# Patient Record
Sex: FEMALE | Race: WHITE | URBAN - METROPOLITAN AREA
[De-identification: names, ages, dates, MRNs, and addresses within clinical notes are randomized per-mention and may not be internally consistent; named-entity substitution may affect disease eponyms.]

---

## 2022-07-26 ENCOUNTER — TELEPHONE ENCOUNTER (OUTPATIENT)
Dept: URBAN - METROPOLITAN AREA CLINIC 80 | Facility: CLINIC | Age: 62
End: 2022-07-26

## 2022-07-26 RX ORDER — PANTOPRAZOLE SODIUM 40 MG
TAKE 1 TABLET BY ORAL ROUTE 2 TIMES A DAY FOR 30 DAYS TABLET, DELAYED RELEASE (ENTERIC COATED) ORAL 2
Qty: 60 | Refills: 0
Start: 2020-02-13

## 2022-08-03 ENCOUNTER — OFFICE VISIT (OUTPATIENT)
Dept: URBAN - METROPOLITAN AREA TELEHEALTH 2 | Facility: TELEHEALTH | Age: 62
End: 2022-08-03
Payer: COMMERCIAL

## 2022-08-03 DIAGNOSIS — K21.00 GASTROESOPHAGEAL REFLUX DISEASE WITH ESOPHAGITIS WITHOUT HEMORRHAGE: ICD-10-CM

## 2022-08-03 PROBLEM — 266433003: Status: ACTIVE | Noted: 2022-08-03

## 2022-08-03 PROCEDURE — 99442 PHONE E/M BY PHYS 11-20 MIN: CPT | Performed by: INTERNAL MEDICINE

## 2022-08-03 RX ORDER — ALPRAZOLAM 1 MG
TABLET ORAL
Qty: 0 | Refills: 0 | Status: ACTIVE | COMMUNITY
Start: 1900-01-01

## 2022-08-03 RX ORDER — FAMOTIDINE 20 MG/1
TABLET, FILM COATED ORAL
Qty: 0 | Refills: 0 | Status: ACTIVE | COMMUNITY
Start: 1900-01-01

## 2022-08-03 RX ORDER — ONDANSETRON 4 MG/1
DISSOLVE  1 TABLET BY ORAL ROUTE EVERY 6 HOURS AS NEEDED TABLET, ORALLY DISINTEGRATING ORAL
Qty: 14 | Refills: 0 | Status: ACTIVE | COMMUNITY
Start: 2020-02-11

## 2022-08-03 RX ORDER — PANTOPRAZOLE SODIUM 40 MG
TAKE 1 TABLET BY ORAL ROUTE 2 TIMES A DAY FOR 30 DAYS TABLET, DELAYED RELEASE (ENTERIC COATED) ORAL 2
Qty: 60 | Refills: 0 | Status: ACTIVE | COMMUNITY
Start: 2020-02-13

## 2022-08-03 RX ORDER — HYDROCORTISONE ACETATE 25 MG/1
_INSERT 1 SUPPOSITORY BY RECTAL ROUTE DAILY FOR 30 DAYS SUPPOSITORY RECTAL 1
Qty: 30 | Refills: 3 | Status: ACTIVE | COMMUNITY
Start: 2017-06-14

## 2022-09-19 ENCOUNTER — P2P PATIENT RECORD (OUTPATIENT)
Age: 62
End: 2022-09-19

## 2022-09-23 ENCOUNTER — TELEPHONE ENCOUNTER (OUTPATIENT)
Dept: URBAN - METROPOLITAN AREA CLINIC 80 | Facility: CLINIC | Age: 62
End: 2022-09-23

## 2022-09-23 RX ORDER — PANTOPRAZOLE SODIUM 40 MG
TAKE 1 TABLET BY ORAL ROUTE 2 TIMES A DAY FOR 30 DAYS TABLET, DELAYED RELEASE (ENTERIC COATED) ORAL 2
Qty: 60 | Refills: 11
Start: 2020-02-13

## 2022-09-28 ENCOUNTER — ERX REFILL RESPONSE (OUTPATIENT)
Dept: URBAN - METROPOLITAN AREA CLINIC 126 | Facility: CLINIC | Age: 62
End: 2022-09-28

## 2022-09-28 RX ORDER — PANTOPRAZOLE SODIUM 40 MG
TAKE 1 TABLET BY ORAL ROUTE 2 TIMES A DAY FOR 30 DAYS TABLET, DELAYED RELEASE (ENTERIC COATED) ORAL 2
Qty: 60 | Refills: 11 | OUTPATIENT

## 2022-09-28 RX ORDER — PANTOPRAZOLE 40 MG/1
TAKE ONE TABLET BY MOUTH TWICE A DAY TABLET, DELAYED RELEASE ORAL
Qty: 60 TABLET | Refills: 0 | OUTPATIENT

## 2022-10-14 ENCOUNTER — WEB ENCOUNTER (OUTPATIENT)
Dept: URBAN - METROPOLITAN AREA CLINIC 126 | Facility: CLINIC | Age: 62
End: 2022-10-14

## 2022-10-14 ENCOUNTER — OFFICE VISIT (OUTPATIENT)
Dept: URBAN - METROPOLITAN AREA CLINIC 126 | Facility: CLINIC | Age: 62
End: 2022-10-14
Payer: COMMERCIAL

## 2022-10-14 VITALS
TEMPERATURE: 96.9 F | DIASTOLIC BLOOD PRESSURE: 80 MMHG | WEIGHT: 146 LBS | HEART RATE: 83 BPM | HEIGHT: 62 IN | BODY MASS INDEX: 26.87 KG/M2 | SYSTOLIC BLOOD PRESSURE: 120 MMHG

## 2022-10-14 DIAGNOSIS — R79.89 ABNORMAL LFTS (LIVER FUNCTION TESTS): ICD-10-CM

## 2022-10-14 PROCEDURE — 99214 OFFICE O/P EST MOD 30 MIN: CPT | Performed by: INTERNAL MEDICINE

## 2022-10-14 RX ORDER — HYDROCORTISONE ACETATE 25 MG/1
_INSERT 1 SUPPOSITORY BY RECTAL ROUTE DAILY FOR 30 DAYS SUPPOSITORY RECTAL 1
Qty: 30 | Refills: 3 | Status: ON HOLD | COMMUNITY
Start: 2017-06-14

## 2022-10-14 RX ORDER — ONDANSETRON 4 MG/1
DISSOLVE  1 TABLET BY ORAL ROUTE EVERY 6 HOURS AS NEEDED TABLET, ORALLY DISINTEGRATING ORAL
Qty: 14 | Refills: 0 | Status: ON HOLD | COMMUNITY
Start: 2020-02-11

## 2022-10-14 RX ORDER — FAMOTIDINE 20 MG/1
TABLET, FILM COATED ORAL
Qty: 0 | Refills: 0 | Status: ON HOLD | COMMUNITY
Start: 1900-01-01

## 2022-10-14 RX ORDER — PANTOPRAZOLE 40 MG/1
TAKE ONE TABLET BY MOUTH TWICE A DAY TABLET, DELAYED RELEASE ORAL
Qty: 60 TABLET | Refills: 0 | Status: ACTIVE | COMMUNITY

## 2022-10-14 RX ORDER — ALPRAZOLAM 1 MG
TABLET ORAL
Qty: 0 | Refills: 0 | Status: ACTIVE | COMMUNITY
Start: 1900-01-01

## 2022-10-24 ENCOUNTER — LAB OUTSIDE AN ENCOUNTER (OUTPATIENT)
Dept: URBAN - METROPOLITAN AREA CLINIC 126 | Facility: CLINIC | Age: 62
End: 2022-10-24

## 2022-10-25 LAB
ALBUMIN/GLOBULIN RATIO: 1.6
ALBUMIN: 4.6
ALKALINE PHOSPHATASE: 82
ALT (SGPT): 25
AST (SGOT): 23
BILIRUBIN, DIRECT: 0.1
BILIRUBIN, INDIRECT: 0.4
BILIRUBIN, TOTAL: 0.5
GLOBULIN: 2.9
PROTEIN, TOTAL: 7.5

## 2024-03-11 ENCOUNTER — LAB (OUTPATIENT)
Dept: URBAN - METROPOLITAN AREA CLINIC 126 | Facility: CLINIC | Age: 64
End: 2024-03-11

## 2024-03-11 ENCOUNTER — OV EP (OUTPATIENT)
Dept: URBAN - METROPOLITAN AREA CLINIC 126 | Facility: CLINIC | Age: 64
End: 2024-03-11
Payer: COMMERCIAL

## 2024-03-11 VITALS
HEART RATE: 85 BPM | DIASTOLIC BLOOD PRESSURE: 80 MMHG | BODY MASS INDEX: 27.16 KG/M2 | SYSTOLIC BLOOD PRESSURE: 120 MMHG | HEIGHT: 62 IN | WEIGHT: 147.6 LBS | TEMPERATURE: 97.2 F

## 2024-03-11 DIAGNOSIS — Z86.010 PERSONAL HISTORY OF COLONIC POLYPS: ICD-10-CM

## 2024-03-11 PROBLEM — 428283002: Status: ACTIVE | Noted: 2024-03-11

## 2024-03-11 PROCEDURE — 99214 OFFICE O/P EST MOD 30 MIN: CPT | Performed by: INTERNAL MEDICINE

## 2024-03-11 RX ORDER — PANTOPRAZOLE 40 MG/1
TAKE ONE TABLET BY MOUTH TWICE A DAY TABLET, DELAYED RELEASE ORAL
Qty: 60 TABLET | Refills: 0 | Status: ON HOLD | COMMUNITY

## 2024-03-11 RX ORDER — FAMOTIDINE 20 MG/1
TABLET, FILM COATED ORAL
Qty: 0 | Refills: 0 | Status: ON HOLD | COMMUNITY
Start: 1900-01-01

## 2024-03-11 RX ORDER — HYDROCORTISONE ACETATE 25 MG/1
_INSERT 1 SUPPOSITORY BY RECTAL ROUTE DAILY FOR 30 DAYS SUPPOSITORY RECTAL 1
Qty: 30 | Refills: 3 | Status: ON HOLD | COMMUNITY
Start: 2017-06-14

## 2024-03-11 RX ORDER — SODIUM, POTASSIUM,MAG SULFATES 17.5-3.13G
354 ML SOLUTION, RECONSTITUTED, ORAL ORAL
Qty: 354 ML | Refills: 0 | OUTPATIENT
Start: 2024-03-11 | End: 2024-03-12

## 2024-03-11 RX ORDER — ALPRAZOLAM 1 MG
TABLET ORAL
Qty: 0 | Refills: 0 | Status: ACTIVE | COMMUNITY
Start: 1900-01-01

## 2024-03-11 RX ORDER — ONDANSETRON 4 MG/1
DISSOLVE  1 TABLET BY ORAL ROUTE EVERY 6 HOURS AS NEEDED TABLET, ORALLY DISINTEGRATING ORAL
Qty: 14 | Refills: 0 | Status: ON HOLD | COMMUNITY
Start: 2020-02-11

## 2024-03-11 NOTE — PHYSICAL EXAM RECTAL:
normal tone, no external hemorrhoids, no masses palpable, no red blood, Tenderness on HAMMAD, Internal hemorrhoids present

## 2024-03-11 NOTE — PHYSICAL EXAM CONSTITUTIONAL:
alert, in no acute distress , well developed, well nourished , in no acute distress , ambulating without difficulty , normal communication ability

## 2024-04-10 ENCOUNTER — COLON (OUTPATIENT)
Dept: URBAN - METROPOLITAN AREA SURGERY CENTER 19 | Facility: SURGERY CENTER | Age: 64
End: 2024-04-10
Payer: COMMERCIAL

## 2024-04-10 ENCOUNTER — LAB (OUTPATIENT)
Dept: URBAN - METROPOLITAN AREA CLINIC 4 | Facility: CLINIC | Age: 64
End: 2024-04-10
Payer: COMMERCIAL

## 2024-04-10 DIAGNOSIS — D12.4 BENIGN NEOPLASM OF DESCENDING COLON: ICD-10-CM

## 2024-04-10 DIAGNOSIS — D12.3 BENIGN NEOPLASM OF TRANSVERSE COLON: ICD-10-CM

## 2024-04-10 DIAGNOSIS — K63.89 OTHER SPECIFIED DISEASES OF INTESTINE: ICD-10-CM

## 2024-04-10 DIAGNOSIS — K62.1 RECTAL POLYP: ICD-10-CM

## 2024-04-10 DIAGNOSIS — Z86.010 PERSONAL HISTORY OF COLONIC POLYPS: ICD-10-CM

## 2024-04-10 PROCEDURE — 45380 COLONOSCOPY AND BIOPSY: CPT | Performed by: STUDENT IN AN ORGANIZED HEALTH CARE EDUCATION/TRAINING PROGRAM

## 2024-04-10 PROCEDURE — 88305 TISSUE EXAM BY PATHOLOGIST: CPT | Performed by: PATHOLOGY

## 2024-04-10 PROCEDURE — 45385 COLONOSCOPY W/LESION REMOVAL: CPT | Performed by: STUDENT IN AN ORGANIZED HEALTH CARE EDUCATION/TRAINING PROGRAM

## 2024-04-10 RX ORDER — ONDANSETRON 4 MG/1
DISSOLVE  1 TABLET BY ORAL ROUTE EVERY 6 HOURS AS NEEDED TABLET, ORALLY DISINTEGRATING ORAL
Qty: 14 | Refills: 0 | Status: ON HOLD | COMMUNITY
Start: 2020-02-11

## 2024-04-10 RX ORDER — PANTOPRAZOLE 40 MG/1
TAKE ONE TABLET BY MOUTH TWICE A DAY TABLET, DELAYED RELEASE ORAL
Qty: 60 TABLET | Refills: 0 | Status: ON HOLD | COMMUNITY

## 2024-04-10 RX ORDER — ALPRAZOLAM 1 MG
TABLET ORAL
Qty: 0 | Refills: 0 | Status: ACTIVE | COMMUNITY
Start: 1900-01-01

## 2024-04-10 RX ORDER — FAMOTIDINE 20 MG/1
TABLET, FILM COATED ORAL
Qty: 0 | Refills: 0 | Status: ON HOLD | COMMUNITY
Start: 1900-01-01

## 2024-04-10 RX ORDER — HYDROCORTISONE ACETATE 25 MG/1
_INSERT 1 SUPPOSITORY BY RECTAL ROUTE DAILY FOR 30 DAYS SUPPOSITORY RECTAL 1
Qty: 30 | Refills: 3 | Status: ON HOLD | COMMUNITY
Start: 2017-06-14